# Patient Record
Sex: MALE | Race: WHITE
[De-identification: names, ages, dates, MRNs, and addresses within clinical notes are randomized per-mention and may not be internally consistent; named-entity substitution may affect disease eponyms.]

---

## 2012-09-25 VITALS
SYSTOLIC BLOOD PRESSURE: 151 MMHG | SYSTOLIC BLOOD PRESSURE: 151 MMHG | DIASTOLIC BLOOD PRESSURE: 89 MMHG | DIASTOLIC BLOOD PRESSURE: 89 MMHG | DIASTOLIC BLOOD PRESSURE: 89 MMHG | DIASTOLIC BLOOD PRESSURE: 89 MMHG | SYSTOLIC BLOOD PRESSURE: 151 MMHG | DIASTOLIC BLOOD PRESSURE: 89 MMHG | SYSTOLIC BLOOD PRESSURE: 151 MMHG | SYSTOLIC BLOOD PRESSURE: 151 MMHG | DIASTOLIC BLOOD PRESSURE: 89 MMHG | SYSTOLIC BLOOD PRESSURE: 151 MMHG | DIASTOLIC BLOOD PRESSURE: 89 MMHG | SYSTOLIC BLOOD PRESSURE: 151 MMHG | DIASTOLIC BLOOD PRESSURE: 89 MMHG | DIASTOLIC BLOOD PRESSURE: 89 MMHG | DIASTOLIC BLOOD PRESSURE: 89 MMHG | SYSTOLIC BLOOD PRESSURE: 151 MMHG | SYSTOLIC BLOOD PRESSURE: 151 MMHG | DIASTOLIC BLOOD PRESSURE: 89 MMHG | SYSTOLIC BLOOD PRESSURE: 151 MMHG | SYSTOLIC BLOOD PRESSURE: 151 MMHG | SYSTOLIC BLOOD PRESSURE: 151 MMHG | DIASTOLIC BLOOD PRESSURE: 89 MMHG | DIASTOLIC BLOOD PRESSURE: 89 MMHG | DIASTOLIC BLOOD PRESSURE: 89 MMHG | SYSTOLIC BLOOD PRESSURE: 151 MMHG | SYSTOLIC BLOOD PRESSURE: 151 MMHG

## 2017-01-25 ENCOUNTER — HOSPITAL ENCOUNTER (OUTPATIENT)
Dept: HOSPITAL 6 - LAB | Age: 62
End: 2017-01-25
Payer: COMMERCIAL

## 2017-01-25 ENCOUNTER — HOSPITAL ENCOUNTER (OUTPATIENT)
Dept: HOSPITAL 19 - ZLAB.WCH | Age: 62
End: 2017-01-25
Attending: MEDICAL GENETICS

## 2017-01-25 DIAGNOSIS — I10: ICD-10-CM

## 2017-01-25 DIAGNOSIS — E11.9: ICD-10-CM

## 2017-01-25 DIAGNOSIS — Z01.89: Primary | ICD-10-CM

## 2017-01-25 DIAGNOSIS — E78.2: ICD-10-CM

## 2017-01-25 DIAGNOSIS — E03.4: Primary | ICD-10-CM

## 2017-03-30 ENCOUNTER — HOSPITAL ENCOUNTER (OUTPATIENT)
Dept: HOSPITAL 6 - PT | Age: 62
LOS: 29 days | Discharge: HOME | End: 2017-04-28
Payer: COMMERCIAL

## 2017-03-30 DIAGNOSIS — M77.9: Primary | ICD-10-CM

## 2017-10-24 ENCOUNTER — HOSPITAL ENCOUNTER (OUTPATIENT)
Dept: HOSPITAL 6 - LAB | Age: 62
End: 2017-10-24
Attending: FAMILY MEDICINE
Payer: COMMERCIAL

## 2017-10-24 ENCOUNTER — HOSPITAL ENCOUNTER (OUTPATIENT)
Dept: HOSPITAL 19 - ZLAB.WCH | Age: 62
End: 2017-10-24

## 2017-10-24 DIAGNOSIS — Z12.31: Primary | ICD-10-CM

## 2017-10-24 DIAGNOSIS — E78.5: ICD-10-CM

## 2017-10-24 DIAGNOSIS — E11.9: Primary | ICD-10-CM

## 2017-10-24 DIAGNOSIS — E03.4: ICD-10-CM

## 2017-10-24 LAB
ALBUMIN SERPL-MCNC: 4.5 G/DL (ref 3.5–5)
ALT SERPL-CCNC: 51 U/L (ref 21–72)
AST SERPL-CCNC: 41 U/L (ref 17–59)
BILIRUB SERPL-MCNC: 0.8 MG/DL (ref 0.2–1.3)
BUN/CREAT SERPL: 13.1 (ref 6–26)
CALCIUM SERPL-MCNC: 10.2 MG/DL (ref 8.4–10.2)
CO2 SERPL-SCNC: 26 MMOL/L (ref 22–30)
GLUCOSE SERPL-MCNC: 142 MG/DL (ref 75–110)
POTASSIUM SERPL-SCNC: 4.5 MMOL/L (ref 3.6–5)
PROT SERPL-MCNC: 7.9 G/DL (ref 6.3–8.2)
SODIUM SERPL-SCNC: 141 MMOL/L (ref 137–145)

## 2017-11-01 ENCOUNTER — HOSPITAL ENCOUNTER (OUTPATIENT)
Dept: HOSPITAL 6 - RAD | Age: 62
End: 2017-11-01
Attending: FAMILY MEDICINE
Payer: COMMERCIAL

## 2017-11-01 DIAGNOSIS — M25.512: Primary | ICD-10-CM

## 2018-03-05 ENCOUNTER — HOSPITAL ENCOUNTER (OUTPATIENT)
Dept: HOSPITAL 6 - LAB | Age: 63
End: 2018-03-05
Attending: FAMILY MEDICINE
Payer: COMMERCIAL

## 2018-03-05 ENCOUNTER — HOSPITAL ENCOUNTER (OUTPATIENT)
Dept: HOSPITAL 19 - ZLAB.WCH | Age: 63
End: 2018-03-05

## 2018-03-05 DIAGNOSIS — E11.9: Primary | ICD-10-CM

## 2018-03-05 DIAGNOSIS — Z01.89: Primary | ICD-10-CM

## 2018-08-08 ENCOUNTER — HOSPITAL ENCOUNTER (OUTPATIENT)
Dept: HOSPITAL 19 - WSST | Age: 63
LOS: 20 days | Discharge: HOME | End: 2018-08-28
Payer: COMMERCIAL

## 2018-08-08 DIAGNOSIS — K21.9: ICD-10-CM

## 2018-08-08 DIAGNOSIS — R49.0: Primary | ICD-10-CM

## 2018-10-30 ENCOUNTER — HOSPITAL ENCOUNTER (OUTPATIENT)
Dept: HOSPITAL 6 - LAB | Age: 63
End: 2018-10-30
Attending: FAMILY MEDICINE
Payer: COMMERCIAL

## 2018-10-30 ENCOUNTER — HOSPITAL ENCOUNTER (OUTPATIENT)
Dept: HOSPITAL 19 - ZLAB.WCH | Age: 63
End: 2018-10-30

## 2018-10-30 DIAGNOSIS — E03.4: Primary | ICD-10-CM

## 2018-10-30 DIAGNOSIS — Z01.89: Primary | ICD-10-CM

## 2018-10-30 DIAGNOSIS — I10: ICD-10-CM

## 2018-10-30 DIAGNOSIS — E78.5: ICD-10-CM

## 2018-10-30 DIAGNOSIS — E11.9: ICD-10-CM

## 2018-10-30 LAB
ALBUMIN SERPL-MCNC: 4.3 G/DL (ref 3.5–5)
ALT SERPL-CCNC: 36 U/L (ref 21–72)
APPEARANCE UR: CLEAR
AST SERPL-CCNC: 29 U/L (ref 17–59)
BASOPHILS # BLD: 0 10*3/UL (ref 0.02–0.1)
BILIRUB SERPL-MCNC: 0.5 MG/DL (ref 0.2–1.3)
BILIRUB UR QL STRIP.AUTO: NEGATIVE
CALCIUM SERPL-MCNC: 9.5 MG/DL (ref 8.4–10.2)
CO2 SERPL-SCNC: 22 MMOL/L (ref 22–30)
COLOR UR AUTO: YELLOW
EOSINOPHIL # BLD: 0.2 10*3/UL (ref 0.04–0.4)
EOSINOPHIL NFR BLD: 3.8 % (ref 0–4)
ERYTHROCYTE [DISTWIDTH] IN BLOOD BY AUTOMATED COUNT: 12.5 % (ref 11.5–14.5)
GLUCOSE SERPL-MCNC: 262 MG/DL (ref 75–110)
HCT VFR BLD AUTO: 36.6 % (ref 42–52)
HGB BLD-MCNC: 13.2 G/DL (ref 13.5–18)
KETONES UR QL STRIP.AUTO: NEGATIVE
LEUKOCYTE ESTERASE UR QL STRIP: NEGATIVE
LYMPHOCYTES # BLD: 1.7 10*3/UL (ref 1.5–4)
MCH RBC QN AUTO: 32 PG (ref 27–31)
MCHC RBC AUTO-ENTMCNC: 36 G/DL (ref 33–37)
MCV RBC AUTO: 89 FL (ref 78–100)
MONOCYTES # BLD: 0.2 10*3/UL (ref 0.2–0.8)
NEUTROPHILS # BLD: 2.8 10*3/UL (ref 1.4–6.5)
NITRITE UR QL STRIP: NEGATIVE
PH UR STRIP.AUTO: 5 [PH] (ref 5–8)
PLATELET # BLD AUTO: 172 K/MM3 (ref 130–400)
PMV BLD AUTO: 11.5 FL (ref 7.4–10.4)
POTASSIUM SERPL-SCNC: 4.2 MMOL/L (ref 3.6–5)
PROT ?TM UR-MCNC: (no result) MG/DL
PROT SERPL-MCNC: 7.4 G/DL (ref 6.3–8.2)
RBC # BLD AUTO: 4.13 M/MM3 (ref 4.2–5.6)
RBC UR QL AUTO: NEGATIVE
SODIUM SERPL-SCNC: 138 MMOL/L (ref 137–145)
SP GR UR STRIP.AUTO: 1.03 (ref 1–1.03)
UROBILINOGEN UR-MCNC: NORMAL MG/DL
WBC # BLD AUTO: 4.9 K/MM3 (ref 4.8–10.8)
WBC #/AREA URNS HPF: 0 /HPF (ref 0–3)

## 2018-10-31 LAB — CREAT UR-MCNC: 99 MG/DL

## 2019-03-07 ENCOUNTER — HOSPITAL ENCOUNTER (OUTPATIENT)
Dept: HOSPITAL 19 - ZLAB.WCH | Age: 64
End: 2019-03-07

## 2019-03-07 ENCOUNTER — HOSPITAL ENCOUNTER (OUTPATIENT)
Dept: HOSPITAL 6 - LAB | Age: 64
End: 2019-03-07
Attending: FAMILY MEDICINE
Payer: COMMERCIAL

## 2019-03-07 DIAGNOSIS — Z01.89: Primary | ICD-10-CM

## 2019-03-07 DIAGNOSIS — I10: ICD-10-CM

## 2019-03-07 DIAGNOSIS — E03.4: Primary | ICD-10-CM

## 2019-03-07 DIAGNOSIS — E78.2: ICD-10-CM

## 2019-03-07 DIAGNOSIS — E11.9: ICD-10-CM

## 2019-03-07 LAB
ALBUMIN SERPL-MCNC: 4.5 G/DL (ref 3.5–5)
ALT SERPL-CCNC: 41 U/L (ref 21–72)
AST SERPL-CCNC: 33 U/L (ref 17–59)
BASOPHILS # BLD: 0.1 10*3/UL (ref 0.02–0.1)
BILIRUB SERPL-MCNC: 0.5 MG/DL (ref 0.2–1.3)
CALCIUM SERPL-MCNC: 9.5 MG/DL (ref 8.4–10.2)
CO2 SERPL-SCNC: 25 MMOL/L (ref 22–30)
CREAT UR-MCNC: 189 MG/DL
EOSINOPHIL # BLD: 0.3 10*3/UL (ref 0.04–0.4)
EOSINOPHIL NFR BLD: 4.8 % (ref 0–4)
ERYTHROCYTE [DISTWIDTH] IN BLOOD BY AUTOMATED COUNT: 12.6 % (ref 11.5–14.5)
GLUCOSE SERPL-MCNC: 211 MG/DL (ref 75–110)
HCT VFR BLD AUTO: 39.1 % (ref 42–52)
HGB BLD-MCNC: 13.7 G/DL (ref 13.5–18)
LYMPHOCYTES # BLD: 1.8 10*3/UL (ref 1.5–4)
MCH RBC QN AUTO: 32 PG (ref 27–31)
MCHC RBC AUTO-ENTMCNC: 35 G/DL (ref 33–37)
MCV RBC AUTO: 90 FL (ref 78–100)
MONOCYTES # BLD: 0.5 10*3/UL (ref 0.2–0.8)
NEUTROPHILS # BLD: 3 10*3/UL (ref 1.4–6.5)
PLATELET # BLD AUTO: 202 K/MM3 (ref 130–400)
PMV BLD AUTO: 11.3 FL (ref 7.4–10.4)
POTASSIUM SERPL-SCNC: 4.2 MMOL/L (ref 3.6–5)
PROT SERPL-MCNC: 8.2 G/DL (ref 6.3–8.2)
RBC # BLD AUTO: 4.33 M/MM3 (ref 4.2–5.6)
SODIUM SERPL-SCNC: 141 MMOL/L (ref 137–145)
WBC # BLD AUTO: 5.7 K/MM3 (ref 4.8–10.8)

## 2019-05-29 ENCOUNTER — HOSPITAL ENCOUNTER (OUTPATIENT)
Dept: HOSPITAL 6 - LAB | Age: 64
End: 2019-05-29
Attending: FAMILY MEDICINE
Payer: COMMERCIAL

## 2019-05-29 DIAGNOSIS — E78.5: ICD-10-CM

## 2019-05-29 DIAGNOSIS — E03.4: Primary | ICD-10-CM

## 2019-05-29 DIAGNOSIS — I10: ICD-10-CM

## 2019-05-29 DIAGNOSIS — E11.9: ICD-10-CM

## 2019-05-29 LAB — CREAT UR-MCNC: 137 MG/DL

## 2019-10-23 ENCOUNTER — HOSPITAL ENCOUNTER (OUTPATIENT)
Dept: HOSPITAL 6 - LAB | Age: 64
End: 2019-10-23
Attending: FAMILY MEDICINE
Payer: COMMERCIAL

## 2019-10-23 DIAGNOSIS — I10: ICD-10-CM

## 2019-10-23 DIAGNOSIS — E03.4: ICD-10-CM

## 2019-10-23 DIAGNOSIS — E78.5: ICD-10-CM

## 2019-10-23 DIAGNOSIS — E11.9: Primary | ICD-10-CM

## 2019-10-23 LAB
ALBUMIN SERPL-MCNC: 4.3 G/DL (ref 3.4–4.8)
ALT SERPL-CCNC: 28 U/L (ref 0–55)
AST SERPL-CCNC: 24 U/L (ref 5–34)
BASOPHILS # BLD: 0 10*3/UL (ref 0.02–0.1)
BILIRUB SERPL-MCNC: 0.4 MG/DL (ref 0.2–1.2)
CALCIUM SERPL-MCNC: 9.4 MG/DL (ref 8.3–10.5)
CO2 SERPL-SCNC: 23 MMOL/L (ref 23–31)
CREAT UR-MCNC: 165 MG/DL
EOSINOPHIL # BLD: 0.3 10*3/UL (ref 0.04–0.4)
EOSINOPHIL NFR BLD: 4.7 % (ref 0–4)
ERYTHROCYTE [DISTWIDTH] IN BLOOD BY AUTOMATED COUNT: 12.5 % (ref 11.5–14.5)
GLUCOSE SERPL-MCNC: 226 MG/DL (ref 75–110)
HCT VFR BLD AUTO: 38.4 % (ref 42–52)
HGB BLD-MCNC: 13.1 G/DL (ref 13.5–18)
LYMPHOCYTES # BLD: 1.8 10*3/UL (ref 1.5–4)
MCH RBC QN AUTO: 31 PG (ref 27–31)
MCHC RBC AUTO-ENTMCNC: 34 G/DL (ref 33–37)
MCV RBC AUTO: 91 FL (ref 78–100)
MONOCYTES # BLD: 0.3 10*3/UL (ref 0.2–0.8)
NEUTROPHILS # BLD: 2.9 10*3/UL (ref 1.4–6.5)
PLATELET # BLD AUTO: 205 K/MM3 (ref 130–400)
PMV BLD AUTO: 11.6 FL (ref 7.4–10.4)
POTASSIUM SERPL-SCNC: 4.6 MMOL/L (ref 3.5–5.1)
PROT SERPL-MCNC: 7.4 G/DL (ref 6.2–8.1)
RBC # BLD AUTO: 4.22 M/MM3 (ref 4.2–5.6)
SODIUM SERPL-SCNC: 140 MMOL/L (ref 136–145)
WBC # BLD AUTO: 5.4 K/MM3 (ref 4.8–10.8)

## 2020-01-14 ENCOUNTER — HOSPITAL ENCOUNTER (OUTPATIENT)
Dept: HOSPITAL 6 - LAB | Age: 65
LOS: 90 days | Discharge: STILL ADMITTED OR ADMINISTRATIVE | End: 2020-04-13
Attending: FAMILY MEDICINE
Payer: COMMERCIAL

## 2020-01-14 DIAGNOSIS — E03.4: Primary | ICD-10-CM

## 2020-01-14 DIAGNOSIS — I10: ICD-10-CM

## 2020-01-14 DIAGNOSIS — E78.5: ICD-10-CM

## 2020-01-14 DIAGNOSIS — E11.9: ICD-10-CM

## 2020-01-14 LAB
ALBUMIN SERPL-MCNC: 4.5 G/DL (ref 3.4–4.8)
ALT SERPL-CCNC: 31 U/L (ref 0–55)
AST SERPL-CCNC: 26 U/L (ref 5–34)
BILIRUB SERPL-MCNC: 0.6 MG/DL (ref 0.2–1.2)
CALCIUM SERPL-MCNC: 9.8 MG/DL (ref 8.3–10.5)
CO2 SERPL-SCNC: 21 MMOL/L (ref 23–31)
GLUCOSE SERPL-MCNC: 121 MG/DL (ref 75–110)
POTASSIUM SERPL-SCNC: 4.3 MMOL/L (ref 3.5–5.1)
PROT SERPL-MCNC: 7.7 G/DL (ref 6.2–8.1)
SODIUM SERPL-SCNC: 137 MMOL/L (ref 136–145)

## 2020-03-02 ENCOUNTER — HOSPITAL ENCOUNTER (OUTPATIENT)
Dept: HOSPITAL 6 - RAD | Age: 65
End: 2020-03-02
Payer: COMMERCIAL

## 2020-03-02 DIAGNOSIS — N18.3: Primary | ICD-10-CM

## 2020-06-25 ENCOUNTER — HOSPITAL ENCOUNTER (OUTPATIENT)
Dept: HOSPITAL 6 - LAB | Age: 65
End: 2020-06-25
Attending: FAMILY MEDICINE
Payer: COMMERCIAL

## 2020-06-25 DIAGNOSIS — I10: ICD-10-CM

## 2020-06-25 DIAGNOSIS — Z12.5: Primary | ICD-10-CM

## 2020-06-25 DIAGNOSIS — E11.9: ICD-10-CM

## 2020-06-25 DIAGNOSIS — K21.9: ICD-10-CM

## 2020-06-25 DIAGNOSIS — E03.4: ICD-10-CM

## 2020-06-25 DIAGNOSIS — E78.5: ICD-10-CM

## 2020-06-25 LAB
ALBUMIN SERPL-MCNC: 4.3 G/DL (ref 3.4–4.8)
ALT SERPL-CCNC: 40 U/L (ref 0–55)
AST SERPL-CCNC: 33 U/L (ref 5–34)
BASOPHILS # BLD: 0 10*3/UL (ref 0.02–0.1)
BILIRUB SERPL-MCNC: 0.4 MG/DL (ref 0.2–1.2)
CALCIUM SERPL-MCNC: 9.5 MG/DL (ref 8.3–10.5)
CO2 SERPL-SCNC: 20 MMOL/L (ref 23–31)
CREAT UR-MCNC: 118 MG/DL
EOSINOPHIL # BLD: 0.2 10*3/UL (ref 0.04–0.4)
EOSINOPHIL NFR BLD: 4.4 % (ref 0–4)
ERYTHROCYTE [DISTWIDTH] IN BLOOD BY AUTOMATED COUNT: 12.2 % (ref 11.5–14.5)
GLUCOSE SERPL-MCNC: 106 MG/DL (ref 75–110)
HCT VFR BLD AUTO: 37.5 % (ref 42–52)
HGB BLD-MCNC: 12.6 G/DL (ref 13.5–18)
LYMPHOCYTES # BLD: 1.5 10*3/UL (ref 1.5–4)
MCH RBC QN AUTO: 31 PG (ref 27–31)
MCHC RBC AUTO-ENTMCNC: 34 G/DL (ref 33–37)
MCV RBC AUTO: 92 FL (ref 78–100)
MONOCYTES # BLD: 0.3 10*3/UL (ref 0.2–0.8)
NEUTROPHILS # BLD: 2.7 10*3/UL (ref 1.4–6.5)
PLATELET # BLD AUTO: 192 K/MM3 (ref 130–400)
PMV BLD AUTO: 11.2 FL (ref 7.4–10.4)
POTASSIUM SERPL-SCNC: 4 MMOL/L (ref 3.5–5.1)
PROT SERPL-MCNC: 7.2 G/DL (ref 6.2–8.1)
RBC # BLD AUTO: 4.08 M/MM3 (ref 4.2–5.6)
SODIUM SERPL-SCNC: 140 MMOL/L (ref 136–145)
WBC # BLD AUTO: 4.8 K/MM3 (ref 4.8–10.8)

## 2020-08-27 ENCOUNTER — HOSPITAL ENCOUNTER (OUTPATIENT)
Dept: HOSPITAL 6 - RAD | Age: 65
End: 2020-08-27
Attending: NURSE PRACTITIONER
Payer: COMMERCIAL

## 2020-08-27 DIAGNOSIS — M17.12: Primary | ICD-10-CM

## 2020-10-12 ENCOUNTER — HOSPITAL ENCOUNTER (OUTPATIENT)
Dept: HOSPITAL 6 - LAB | Age: 65
End: 2020-10-12
Attending: FAMILY MEDICINE
Payer: COMMERCIAL

## 2020-10-12 DIAGNOSIS — I10: ICD-10-CM

## 2020-10-12 DIAGNOSIS — E11.9: ICD-10-CM

## 2020-10-12 DIAGNOSIS — E03.4: Primary | ICD-10-CM

## 2020-10-12 DIAGNOSIS — E78.5: ICD-10-CM

## 2020-10-12 LAB
ALBUMIN SERPL-MCNC: 4.2 G/DL (ref 3.4–4.8)
ALT SERPL-CCNC: 32 U/L (ref 0–55)
AST SERPL-CCNC: 26 U/L (ref 5–34)
BASOPHILS # BLD: 0 10*3/UL (ref 0.02–0.1)
BILIRUB SERPL-MCNC: 0.4 MG/DL (ref 0.2–1.2)
CALCIUM SERPL-MCNC: 8.9 MG/DL (ref 8.3–10.5)
CO2 SERPL-SCNC: 22 MMOL/L (ref 23–31)
EOSINOPHIL # BLD: 0.3 10*3/UL (ref 0.04–0.4)
EOSINOPHIL NFR BLD: 5.7 % (ref 0–4)
ERYTHROCYTE [DISTWIDTH] IN BLOOD BY AUTOMATED COUNT: 12.5 % (ref 11.5–14.5)
GLUCOSE SERPL-MCNC: 206 MG/DL (ref 75–110)
HCT VFR BLD AUTO: 36.2 % (ref 42–52)
HGB BLD-MCNC: 12.1 G/DL (ref 13.5–18)
LYMPHOCYTES # BLD: 1.7 10*3/UL (ref 1.5–4)
MCH RBC QN AUTO: 31 PG (ref 27–31)
MCHC RBC AUTO-ENTMCNC: 33 G/DL (ref 33–37)
MCV RBC AUTO: 92 FL (ref 78–100)
MONOCYTES # BLD: 0.3 10*3/UL (ref 0.2–0.8)
NEUTROPHILS # BLD: 2.5 10*3/UL (ref 1.4–6.5)
PLATELET # BLD AUTO: 182 K/MM3 (ref 130–400)
PMV BLD AUTO: 10.6 FL (ref 7.4–10.4)
POTASSIUM SERPL-SCNC: 3.9 MMOL/L (ref 3.5–5.1)
PROT SERPL-MCNC: 7 G/DL (ref 6.2–8.1)
RBC # BLD AUTO: 3.92 M/MM3 (ref 4.2–5.6)
SODIUM SERPL-SCNC: 141 MMOL/L (ref 136–145)
WBC # BLD AUTO: 4.9 K/MM3 (ref 4.8–10.8)

## 2021-02-09 ENCOUNTER — HOSPITAL ENCOUNTER (OUTPATIENT)
Dept: HOSPITAL 6 - LAB | Age: 66
End: 2021-02-09
Attending: FAMILY MEDICINE
Payer: COMMERCIAL

## 2021-02-09 DIAGNOSIS — E78.5: ICD-10-CM

## 2021-02-09 DIAGNOSIS — E11.9: ICD-10-CM

## 2021-02-09 DIAGNOSIS — E03.4: ICD-10-CM

## 2021-02-09 DIAGNOSIS — Z00.00: Primary | ICD-10-CM

## 2021-02-09 DIAGNOSIS — Z12.5: ICD-10-CM

## 2021-02-09 LAB
ALBUMIN SERPL-MCNC: 4.2 G/DL (ref 3.4–4.8)
ALT SERPL-CCNC: 36 U/L (ref 0–55)
AST SERPL-CCNC: 28 U/L (ref 5–34)
BASOPHILS # BLD: 0.1 10*3/UL (ref 0.02–0.1)
BILIRUB SERPL-MCNC: 0.3 MG/DL (ref 0.2–1.2)
CALCIUM SERPL-MCNC: 9.1 MG/DL (ref 8.3–10.5)
CO2 SERPL-SCNC: 25 MMOL/L (ref 23–31)
EOSINOPHIL # BLD: 0.2 10*3/UL (ref 0.04–0.4)
EOSINOPHIL NFR BLD: 4.9 % (ref 0–4)
ERYTHROCYTE [DISTWIDTH] IN BLOOD BY AUTOMATED COUNT: 13 % (ref 11.5–14.5)
GLUCOSE SERPL-MCNC: 192 MG/DL (ref 75–110)
HCT VFR BLD AUTO: 38.8 % (ref 42–52)
HGB BLD-MCNC: 13.1 G/DL (ref 13.5–18)
LYMPHOCYTES # BLD: 1.6 10*3/UL (ref 1.5–4)
MCH RBC QN AUTO: 31 PG (ref 27–31)
MCHC RBC AUTO-ENTMCNC: 34 G/DL (ref 33–37)
MCV RBC AUTO: 90 FL (ref 78–100)
MONOCYTES # BLD: 0.3 10*3/UL (ref 0.2–0.8)
NEUTROPHILS # BLD: 2.6 10*3/UL (ref 1.4–6.5)
PLATELET # BLD AUTO: 204 K/MM3 (ref 130–400)
PMV BLD AUTO: 11 FL (ref 7.4–10.4)
POTASSIUM SERPL-SCNC: 4 MMOL/L (ref 3.5–5.1)
PROT SERPL-MCNC: 7.4 G/DL (ref 6.2–8.1)
RBC # BLD AUTO: 4.3 M/MM3 (ref 4.2–5.6)
SODIUM SERPL-SCNC: 139 MMOL/L (ref 136–145)
WBC # BLD AUTO: 4.9 K/MM3 (ref 4.8–10.8)

## 2021-09-02 ENCOUNTER — HOSPITAL ENCOUNTER (OUTPATIENT)
Dept: HOSPITAL 6 - LAB | Age: 66
End: 2021-09-02
Attending: FAMILY MEDICINE
Payer: COMMERCIAL

## 2021-09-02 DIAGNOSIS — E78.5: ICD-10-CM

## 2021-09-02 DIAGNOSIS — E03.4: ICD-10-CM

## 2021-09-02 DIAGNOSIS — E11.9: ICD-10-CM

## 2021-09-02 DIAGNOSIS — Z00.00: Primary | ICD-10-CM

## 2021-09-02 LAB
ALBUMIN SERPL-MCNC: 4.1 G/DL (ref 3.4–4.8)
ALT SERPL-CCNC: 29 U/L (ref 0–55)
AST SERPL-CCNC: 23 U/L (ref 5–34)
BASOPHILS # BLD: 0.04 10*3/UL (ref 0.02–0.1)
BILIRUB SERPL-MCNC: 0.4 MG/DL (ref 0.2–1.2)
CALCIUM SERPL-MCNC: 9.9 MG/DL (ref 8.3–10.5)
CO2 SERPL-SCNC: 20 MMOL/L (ref 23–31)
EOSINOPHIL # BLD: 0.24 10*3/UL (ref 0.04–0.4)
EOSINOPHIL NFR BLD: 4.7 % (ref 0–4)
ERYTHROCYTE [DISTWIDTH] IN BLOOD BY AUTOMATED COUNT: 12.2 % (ref 11.5–14.5)
GLUCOSE SERPL-MCNC: 287 MG/DL (ref 75–110)
HCT VFR BLD AUTO: 36.3 % (ref 42–52)
HGB BLD-MCNC: 12.5 G/DL (ref 13.5–18)
LYMPHOCYTES # BLD: 1.65 10*3/UL (ref 1.5–4)
MCH RBC QN AUTO: 32 PG (ref 27–31)
MCHC RBC AUTO-ENTMCNC: 34 G/DL (ref 33–37)
MCV RBC AUTO: 92 FL (ref 78–100)
MONOCYTES # BLD: 0.29 10*3/UL (ref 0.2–0.8)
NEUTROPHILS # BLD: 2.87 10*3/UL (ref 1.4–6.5)
PLATELET # BLD AUTO: 167 K/MM3 (ref 130–400)
PMV BLD AUTO: 11.2 FL (ref 7.4–10.4)
POTASSIUM SERPL-SCNC: 4.6 MMOL/L (ref 3.5–5.1)
PROT SERPL-MCNC: 7.3 G/DL (ref 6.2–8.1)
RBC # BLD AUTO: 3.93 M/MM3 (ref 4.2–5.6)
SODIUM SERPL-SCNC: 137 MMOL/L (ref 136–145)
WBC # BLD AUTO: 5.1 K/MM3 (ref 4.8–10.8)

## 2021-11-15 ENCOUNTER — HOSPITAL ENCOUNTER (OUTPATIENT)
Dept: HOSPITAL 6 - LAB | Age: 66
End: 2021-11-15
Attending: FAMILY MEDICINE
Payer: COMMERCIAL

## 2021-11-15 DIAGNOSIS — E11.9: Primary | ICD-10-CM

## 2021-11-17 LAB
ALBUMIN SERPL-MCNC: 4.3 G/DL (ref 3.4–4.8)
ALT SERPL-CCNC: 33 U/L (ref 0–55)
AST SERPL-CCNC: 31 U/L (ref 5–34)
BILIRUB SERPL-MCNC: 0.4 MG/DL (ref 0.2–1.2)
CALCIUM SERPL-MCNC: 9.3 MG/DL (ref 8.3–10.5)
CO2 SERPL-SCNC: 22 MMOL/L (ref 23–31)
GLUCOSE SERPL-MCNC: 133 MG/DL (ref 75–110)
POTASSIUM SERPL-SCNC: 4.2 MMOL/L (ref 3.5–5.1)
PROT SERPL-MCNC: 7 G/DL (ref 6.2–8.1)
SODIUM SERPL-SCNC: 141 MMOL/L (ref 136–145)

## 2022-02-25 ENCOUNTER — HOSPITAL ENCOUNTER (OUTPATIENT)
Dept: HOSPITAL 6 - LAB | Age: 67
End: 2022-02-25
Attending: NURSE PRACTITIONER
Payer: MEDICARE

## 2022-02-25 DIAGNOSIS — N40.0: Primary | ICD-10-CM

## 2022-02-25 DIAGNOSIS — E11.9: ICD-10-CM

## 2022-02-25 DIAGNOSIS — I10: ICD-10-CM

## 2022-02-25 LAB
ALBUMIN SERPL-MCNC: 4.5 G/DL (ref 3.4–4.8)
ALT SERPL-CCNC: 47 U/L (ref 0–55)
APPEARANCE UR: CLEAR
AST SERPL-CCNC: 39 U/L (ref 5–34)
BILIRUB SERPL-MCNC: 0.5 MG/DL (ref 0.2–1.2)
BILIRUB UR QL STRIP.AUTO: NEGATIVE
CALCIUM SERPL-MCNC: 9.8 MG/DL (ref 8.3–10.5)
CO2 SERPL-SCNC: 25 MMOL/L (ref 23–31)
COLOR UR AUTO: YELLOW
ERYTHROCYTE [DISTWIDTH] IN BLOOD BY AUTOMATED COUNT: 12.3 % (ref 11.5–14.5)
GLUCOSE SERPL-MCNC: 147 MG/DL (ref 75–110)
GLUCOSE UR QL STRIP.AUTO: NEGATIVE
HCT VFR BLD AUTO: 39.1 % (ref 42–52)
HGB BLD-MCNC: 13.3 G/DL (ref 13.5–18)
KETONES UR QL STRIP.AUTO: NEGATIVE
LEUKOCYTE ESTERASE UR QL STRIP: NEGATIVE
MCH RBC QN AUTO: 32 PG (ref 27–31)
MCHC RBC AUTO-ENTMCNC: 34 G/DL (ref 33–37)
MCV RBC AUTO: 93 FL (ref 78–100)
NITRITE UR QL STRIP: NEGATIVE
PH UR STRIP.AUTO: 5 [PH] (ref 5–8)
PLATELET # BLD AUTO: 164 K/MM3 (ref 130–400)
PMV BLD AUTO: 11.8 FL (ref 7.4–10.4)
POTASSIUM SERPL-SCNC: 4 MMOL/L (ref 3.5–5.1)
PROT ?TM UR-MCNC: (no result) MG/DL
PROT SERPL-MCNC: 7.4 G/DL (ref 6.2–8.1)
RBC # BLD AUTO: 4.22 M/MM3 (ref 4.2–5.6)
RBC UR QL AUTO: NEGATIVE
SODIUM SERPL-SCNC: 142 MMOL/L (ref 136–145)
SP GR UR STRIP.AUTO: 1.02 (ref 1–1.03)
UROBILINOGEN UR-MCNC: NORMAL MG/DL
WBC # BLD AUTO: 4.2 K/MM3 (ref 4.8–10.8)
WBC #/AREA URNS HPF: (no result) /HPF (ref 0–3)

## 2022-04-04 ENCOUNTER — HOSPITAL ENCOUNTER (OUTPATIENT)
Dept: HOSPITAL 6 - MSO | Age: 67
Discharge: HOME | End: 2022-04-04
Payer: MEDICARE

## 2022-04-04 DIAGNOSIS — K21.9: ICD-10-CM

## 2022-04-04 DIAGNOSIS — E66.9: ICD-10-CM

## 2022-04-04 DIAGNOSIS — Z12.11: Primary | ICD-10-CM

## 2022-04-04 DIAGNOSIS — D12.3: ICD-10-CM

## 2022-04-04 DIAGNOSIS — Z80.0: ICD-10-CM

## 2022-04-04 DIAGNOSIS — K31.7: ICD-10-CM

## 2022-04-04 DIAGNOSIS — K29.80: ICD-10-CM

## 2022-06-08 ENCOUNTER — HOSPITAL ENCOUNTER (OUTPATIENT)
Dept: HOSPITAL 19 - SDCO | Age: 67
LOS: 1 days | Discharge: HOME | End: 2022-06-09
Attending: UROLOGY
Payer: MEDICARE

## 2022-06-08 VITALS — HEART RATE: 55 BPM | SYSTOLIC BLOOD PRESSURE: 139 MMHG | DIASTOLIC BLOOD PRESSURE: 75 MMHG

## 2022-06-08 VITALS — TEMPERATURE: 97.3 F | SYSTOLIC BLOOD PRESSURE: 149 MMHG | HEART RATE: 50 BPM | DIASTOLIC BLOOD PRESSURE: 71 MMHG

## 2022-06-08 VITALS — HEIGHT: 74.02 IN | WEIGHT: 268.08 LBS | BODY MASS INDEX: 34.4 KG/M2

## 2022-06-08 VITALS — DIASTOLIC BLOOD PRESSURE: 74 MMHG | SYSTOLIC BLOOD PRESSURE: 134 MMHG | HEART RATE: 68 BPM | TEMPERATURE: 97.4 F

## 2022-06-08 VITALS — TEMPERATURE: 97.3 F | SYSTOLIC BLOOD PRESSURE: 146 MMHG | HEART RATE: 50 BPM | DIASTOLIC BLOOD PRESSURE: 70 MMHG

## 2022-06-08 VITALS — HEART RATE: 52 BPM | SYSTOLIC BLOOD PRESSURE: 137 MMHG | DIASTOLIC BLOOD PRESSURE: 73 MMHG

## 2022-06-08 VITALS — HEART RATE: 56 BPM | SYSTOLIC BLOOD PRESSURE: 137 MMHG | DIASTOLIC BLOOD PRESSURE: 75 MMHG

## 2022-06-08 VITALS — HEART RATE: 61 BPM | SYSTOLIC BLOOD PRESSURE: 135 MMHG | DIASTOLIC BLOOD PRESSURE: 76 MMHG

## 2022-06-08 VITALS — DIASTOLIC BLOOD PRESSURE: 66 MMHG | SYSTOLIC BLOOD PRESSURE: 134 MMHG | HEART RATE: 56 BPM

## 2022-06-08 DIAGNOSIS — C61: Primary | ICD-10-CM

## 2022-06-08 DIAGNOSIS — Z79.4: ICD-10-CM

## 2022-06-08 NOTE — NUR
PATIENT ARRIVED TO ROOM DENIES PAIN AT THIS TIME, NOTED CBI RUNNING AT
MODERATE RATE AND TORRES CATHETER DRAINING CLEAR PALE YELLOW URINE, PATIENT
CONNECTED TO VS MACHINE FOR POST PROCEDURE VITALS ORIENTED TO ROOM AND
QUESTIONS ANSWERED

## 2022-06-09 VITALS — SYSTOLIC BLOOD PRESSURE: 156 MMHG | TEMPERATURE: 98 F | HEART RATE: 79 BPM | DIASTOLIC BLOOD PRESSURE: 78 MMHG

## 2022-06-09 VITALS — DIASTOLIC BLOOD PRESSURE: 67 MMHG | TEMPERATURE: 97.5 F | HEART RATE: 73 BPM | SYSTOLIC BLOOD PRESSURE: 146 MMHG

## 2022-06-09 VITALS — HEART RATE: 80 BPM | DIASTOLIC BLOOD PRESSURE: 74 MMHG | SYSTOLIC BLOOD PRESSURE: 120 MMHG | TEMPERATURE: 97.5 F

## 2022-06-09 VITALS — TEMPERATURE: 97.4 F | HEART RATE: 68 BPM | SYSTOLIC BLOOD PRESSURE: 145 MMHG | DIASTOLIC BLOOD PRESSURE: 83 MMHG

## 2022-06-09 NOTE — NUR
JUDY met with the patient and his wife, Floyd (ph#[phone]), to discuss
discharge plan. The patient lives in [place] with Floyd. He reports
independence with ADLs and does not have any DME. The patient's primary care
provider is Peg Church and he receives his medications from
[email or web address]. He reports no difficulties obtaining his meds. The patient does
not have a DPOA-HC and he was not interested in completing one at this time.
The patient plans on returning home with his wife upon discharge. No
additional needs at this time.
 
*Discharge plan: home with wife*

## 2022-06-09 NOTE — NUR
PT HAS BEEN DISCHARGED TO HOME WITH SPOUSE. PT TRANSFERRED TO "[facility] VEHICLE
VIA WC ACCOMPANIED BY THIS NURSE. PT STATES THAT SORENESS WITH URINATING
DECREASES EACH TIME HE GOES. URINE IS STILL RED BUT BECOMES LIGHTER EACH TIME,
IS CLEAR, NO CLOTS SEEN. DISCHARGE EDUCATION ET INSTRUCTIONS GIVEN TO BOTH PT
ET SPOUSE, VERBALIZES UNDERSTANDING.

## 2022-06-09 NOTE — NUR
PT SITTING UP IN BED. URINE DRAINING INTO TORRES CATHETER IS DARK PINK ET
CLOUDY WITH OCCASIONAL SMALL TISSUE PIECES SEEN. PT STATES THAT HE HAS NOT HAD
PAIN SINCE CBI WAS TURNED OFF. PT'S WIFE IS @ BEDSIDE.

## 2022-06-09 NOTE — NUR
DR. LAGUNA CALLED ET UPDATED ON PT CONDITION, ORDERS RECEIVED TO CONTINUE TO
PRIME ET PULL TORRES CATHETER.

## 2022-06-09 NOTE — NUR
PT HAS CALLED ET STATES THAT HE IS HAVING INCREASING PRESSURE ET DISCOMFORT
FROM THE TORRES CATHETER. CBI APPEARS TO NOT BE DRAINING. DR. LAGUNA IS IN PT'S
ROOM FOR ROUNDS ET IRRIGATES CATHETER, NO CLOTS SEEN BUT CBI BEGINS DRAINING,
PT HAS RELIEF FROM DISCOMFORT. CBI TURNED OFF @ THIS TIME. ORDERS RECEIVED
FROM DR. LAGUNA TO PRIME ET PULL CATHETER IF URINE REMAINS CLEAR FOR AWHILE.
IVF INFUSING. PT ENCOURAGED TO DRINK LOTS OF WATER, VERBALIZES UNDERSTANDING.

## 2022-06-09 NOTE — NUR
PT HAS COMPLETED 6 BOTTLE ROUTINE. THE LAST CUP OF URINE IS CHERRY RED ET
CLEAR, NO CLOTS SEEN. PT DENIES ANY PROBLEMS VOIDING, IS DRINKING WATER WELL.
IV HAS BEEN SALINE LOCKED ET IVF STOPPED. PT DENIES ANY FURTHER BLADDER
SPASMS, IS GOING TO ORDER LUNCH. RESPIRATIONS UNLABORED ON ROOM AIR. CALL
LIGHT WITHIN REACH.

## 2022-06-09 NOTE — NUR
Pt to the floor at appoximately 1850. Pt is A&Ox4, assessment and medication
administration completed without difficulty. Pt has CBI running at a slow rate
at this time; pt complains of bladder feeling full. Pts CBI is emptying
without difficulty, no signs of clots present and urine is light pink in
color. Tolerating oral intake well without complaints of N/V. No complaints of
pain thus far this shift. All other needs met at this time, call light within
reach.

## 2022-06-09 NOTE — NUR
PT'S TORRES CATHETER HAS NOT YET BEEN REMOVED R/T INCREASED REDNESS ET
CLOUDINESS IN CATHETER TUBING. PT STATES THAT HE BEGAN HAVING INCREASED
PRESSURE FROM CATHETER AGAIN, STATES THAT IT'S NOT AS SEVERE AS EARLIER BUT
UNCOMFORTABLE. EXPLAINED POSSIBILITY OF BLADDER SPASMS ET USE OF B&O
SUPPOSITORY TO PT, VERBALIZES UNDERSTANDING. B&O SUPPOSITORY ADMINISTERED, PT
TOLERATES WELL.

## 2022-06-09 NOTE — NUR
TORRES CATHETER HAS BEEN PRIMED ET PULLED, PT TOLERATED WELL. 30CC PULLED FROM
BALLOON. PT WAS ABLE TO VOID RIGHT AWAY, URINE IS RED ET CLOUDY WITH 3 SMALL
CLOTS. PT STANDS TO URINATE, TOLERATES WELL.

## 2022-09-09 ENCOUNTER — HOSPITAL ENCOUNTER (OUTPATIENT)
Dept: HOSPITAL 6 - RAD | Age: 67
End: 2022-09-09
Attending: NURSE PRACTITIONER
Payer: MEDICARE

## 2022-09-09 DIAGNOSIS — R22.1: Primary | ICD-10-CM
